# Patient Record
Sex: FEMALE | Race: WHITE
[De-identification: names, ages, dates, MRNs, and addresses within clinical notes are randomized per-mention and may not be internally consistent; named-entity substitution may affect disease eponyms.]

---

## 2018-03-29 NOTE — EDM.PDOC
ED HPI GENERAL MEDICAL PROBLEM





- General


Chief Complaint: Respiratory Problem


Stated Complaint: Shortness of breath


Time Seen by Provider: 03/29/18 19:14


Source of Information: Reports: Patient, EMS


History Limitations: Reports: No Limitations





- History of Present Illness


INITIAL COMMENTS - FREE TEXT/NARRATIVE: 





46 YO WF presents to ER by EMS with complaints of nonproductive cough and 

shortness of breath after exposure to bug spray. Pt reports she set off bug 

bombs in house earlier in the day and when she went back inside to check on the 

house she began to feel short of breath with persistent coughing causing her to 

call EMS for further evaluation and treatment. Pt with history of tobacco use 

and bronchitis but denies any chronic pulmonary problems. EMS reports pt met 

them at the curb in [facility]. Pt currently without any complaints and states she is 

feeling better after oxygen. Pt denies any eye or skin irritation. Raid bug 

bomb is nontoxic and without organophosphate per label. 


Onset: Sudden


Duration: Hour(s): (1)


Location: Reports: Generalized


Improves with: Reports: None


Worsens with: Reports: None


Associated Symptoms: Reports: Cough, Shortness of Breath.  Denies: Confusion, 

Chest Pain, cough w sputum, Diaphoresis, Fever/Chills, Headaches, Malaise, 

Nausea/Vomiting, Rash, Seizure, Syncope





ED ROS GENERAL





- Review of Systems


Review Of Systems: See Below


Constitutional: Reports: No Symptoms


HEENT: Reports: No Symptoms


Respiratory: Reports: Shortness of Breath


Cardiovascular: Reports: No Symptoms


Endocrine: Reports: No Symptoms


GI/Abdominal: Reports: No Symptoms


: Reports: No Symptoms


Musculoskeletal: Reports: No Symptoms


Skin: Reports: No Symptoms


Neurological: Reports: No Symptoms


Psychiatric: Reports: No Symptoms


Hematologic/Lymphatic: Reports: No Symptoms


Immunologic: Reports: No Symptoms





ED EXAM, GENERAL





- Physical Exam


Exam: See Below


Exam Limited By: No Limitations


General Appearance: Alert, WD/WN, No Apparent Distress


Eye Exam: Bilateral Eye: EOMI, PERRL


Nose: Normal Inspection, Normal Mucosa, No Blood


Throat/Mouth: Normal Inspection, Normal Lips, Normal Teeth, Normal Gums, Normal 

Oropharynx, Normal Voice, No Airway Compromise


Head: Atraumatic, Normocephalic


Neck: Normal Inspection, Supple, Non-Tender, Full Range of Motion


Respiratory/Chest: No Respiratory Distress, No Accessory Muscle Use, Chest Non-

Tender, Decreased Breath Sounds, Wheezing.  No: Respiratory Distress, Accessory 

Muscle Use, Retractions, Splinting, Prolonged Expiration


Cardiovascular: Normal Peripheral Pulses, Regular Rate, Rhythm, No Edema, No 

Gallop, No JVD, No Murmur, No Rub


GI/Abdominal: Normal Bowel Sounds, Soft, Non-Tender, No Organomegaly, No 

Distention, No Abnormal Bruit, No Mass


Back Exam: Normal Inspection, Full Range of Motion, NT


Extremities: Normal Inspection, Normal Range of Motion, Non-Tender, Normal 

Capillary Refill, No Pedal Edema


Neurological: Alert, Oriented, CN II-XII Intact, Normal Cognition, Normal Gait, 

Normal Reflexes, No Motor/Sensory Deficits


Psychiatric: Normal Affect, Normal Mood


Skin Exam: Warm, Dry, Intact, Normal Color, No Rash


Lymphatic: No Adenopathy





Course





- Vital Signs


Last Recorded V/S: 


 Last Vital Signs











Temp  36.5 C   03/29/18 19:27


 


Pulse  91   03/29/18 19:27


 


Resp  16   03/29/18 19:27


 


BP  119/68   03/29/18 19:27


 


Pulse Ox  95   03/29/18 19:27














- Orders/Labs/Meds


Orders: 


 Active Orders 24 hr











 Category Date Time Status


 


 RT Post Treatment Assessment [RC] Click to Edit Care  03/29/18 19:36 Ordered


 


 RT Pre-Treatment Assessment [RC] Click to Edit Care  03/29/18 19:36 Ordered


 


 Chest 2V [CR] Stat Exams  03/29/18 19:25 Ordered


 


 Albuterol [Ventolin HFA] Med  03/29/18 19:35 Ordered





 8 gm INH Q4H PRN   








 Medication Orders





Albuterol (Ventolin Hfa)  8 gm INH Q4H PRN


   PRN Reason: Shortness of Breath








Meds: 


Medications











Generic Name Dose Route Start Last Admin





  Trade Name Freq  PRN Reason Stop Dose Admin


 


Albuterol  8 gm  03/29/18 19:35  





  Ventolin Hfa  INH   





  Q4H PRN   





  Shortness of Breath   





     





     





     














Discontinued Medications














Generic Name Dose Route Start Last Admin





  Trade Name Freq  PRN Reason Stop Dose Admin


 


Albuterol/Ipratropium  Confirm  03/29/18 19:18  





  Duoneb 3.0-0.5 Mg/3 Ml  Administered  03/29/18 19:19  





  Dose   





  3 ml   





  .ROUTE   





  .STK-MED ONE   





     





     





     





     














- Radiology Interpretation


Free Text/Narrative:: 





CXR- NAD





Departure





- Departure


Time of Disposition: 19:38


Disposition: Home, Self-Care 01


Condition: Good


Clinical Impression: 


 Bronchospasm





Exposure to respiratory irritant


Qualifiers:


 Encounter type: initial encounter Qualified Code(s): T75.89XA - Other 

specified effects of external causes, initial encounter








- Discharge Information


Instructions:  Bronchospasm, Adult, Easy-to-Read


Referrals: 


Julisa Hernández MD [Physician] - 


Forms:  ED Department Discharge


Additional Instructions: 


1. discharge home


2. albuterol MDI Q4 and PRN 


3. avoid home x 24 hours due to irritant


4. follow up in clinic for further evaluation and treatment


5. return to ER for worsening symptoms





- My Orders


Last 24 Hours: 


My Active Orders





03/29/18 19:25


Chest 2V [CR] Stat 





03/29/18 19:35


Albuterol [Ventolin HFA]   8 gm INH Q4H PRN 





03/29/18 19:36


RT Post Treatment Assessment [RC] Click to Edit 


RT Pre-Treatment Assessment [RC] Click to Edit 














- Assessment/Plan


Last 24 Hours: 


My Active Orders





03/29/18 19:25


Chest 2V [CR] Stat 





03/29/18 19:35


Albuterol [Ventolin HFA]   8 gm INH Q4H PRN 





03/29/18 19:36


RT Post Treatment Assessment [RC] Click to Edit 


RT Pre-Treatment Assessment [RC] Click to Edit 











Assessment:: 





1. Bronchospasm secondary to respiratory irritant





Plan: 





1. discharge home


2. albuterol MDI Q4 and PRN 


3. avoid home x 24 hours due to irritant


4. follow up in clinic for further evaluation and treatment


5. return to ER for worsening symptoms

## 2019-01-22 ENCOUNTER — HOSPITAL ENCOUNTER (EMERGENCY)
Dept: HOSPITAL 77 - KA.ED | Age: 49
Discharge: HOME | End: 2019-01-22
Payer: MEDICAID

## 2019-01-22 DIAGNOSIS — Z91.030: ICD-10-CM

## 2019-01-22 DIAGNOSIS — Z91.040: ICD-10-CM

## 2019-01-22 DIAGNOSIS — Z79.899: ICD-10-CM

## 2019-01-22 DIAGNOSIS — Z88.8: ICD-10-CM

## 2019-01-22 DIAGNOSIS — G43.909: Primary | ICD-10-CM

## 2019-01-22 RX ADMIN — DIPHENHYDRAMINE HYDROCHLORIDE ONE MG: 50 INJECTION, SOLUTION INTRAMUSCULAR; INTRAVENOUS at 21:54

## 2019-01-22 RX ADMIN — METHYLPREDNISOLONE SODIUM SUCCINATE ONE MG: 125 INJECTION, POWDER, FOR SOLUTION INTRAMUSCULAR; INTRAVENOUS at 21:53

## 2019-01-22 RX ADMIN — ONDANSETRON ONE MG: 2 INJECTION, SOLUTION INTRAMUSCULAR; INTRAVENOUS at 21:54

## 2019-01-22 NOTE — EDM.PDOC
ED HPI GENERAL MEDICAL PROBLEM





- General


Chief Complaint: Headache


Stated Complaint: HEADACHE


Time Seen by Provider: 01/22/19 21:45


Source of Information: Reports: Patient


History Limitations: Reports: No Limitations





- History of Present Illness


INITIAL COMMENTS - FREE TEXT/NARRATIVE: 





Patient is a 48-year-old female who presents to the emergency department this 

evening with a complaint of headache.  Patient states she does have a history 

of chronic migraine headaches, this headache began this morning and 

progressively worsened during the day.  Patient said that she had one episode 

of nausea and vomiting at 1700 today.  Patient has an extensive psych history, 

and is on multiple psychological medications.  Patient denies fever, trauma, 

chest pain, shortness of breath, head injury, abdominal pain, blurry vision, 

out of country travel, or social drug use.





Onset: Gradual


Duration: Hour(s):


Location: Reports: Head


Quality: Reports: Pressure


Severity: Moderate


Improves with: Reports: None


Worsens with: Reports: None


Associated Symptoms: Reports: Headaches, Nausea/Vomiting.  Denies: Chest Pain, 

Fever/Chills, Shortness of Breath


  ** Headache


Pain Score (Numeric/FACES): 10





- Related Data


 Allergies











Allergy/AdvReac Type Severity Reaction Status Date / Time


 


aspirin Allergy  Itching Verified 01/22/19 21:22


 


bee venom protein (honey bee) Allergy  Anaphylactic Verified 01/22/19 21:22





   Shock  


 


Fish Containing Products Allergy  Anaphylactic Verified 01/22/19 21:22





   Shock  


 


hydrochlorothiazide Allergy  Hives Verified 01/22/19 21:22


 


latex Allergy  Rash Verified 01/22/19 21:22


 


shellfish derived Allergy  Anaphylactic Verified 01/22/19 21:22





   Shock  











Home Meds: 


 Home Meds





Famotidine 20 mg PO DAILY 03/30/18 [History]


Gabapentin [Neurontin] 300 mg PO BID 03/30/18 [History]


PARoxetine HCl [Paroxetine HCl] 30 mg PO DAILY 03/30/18 [History]


Pentoxifylline [TRENtal] 400 mg PO BID 03/30/18 [History]


busPIRone [Buspar] 15 mg PO BID 03/30/18 [History]


diazePAM [Valium] 5 mg PO BID 03/30/18 [History]


Cyclobenzaprine [Flexeril] 10 mg PO BID 10/24/18 [History]


Loratadine/Pseudoephedrine [Claritin-D 24 Hour Tablet] 1 each PO DAILY 10/24/18 

[History]


Rizatriptan Benzoate [Rizatriptan] 10 mg PO ASDIRECTED PRN 10/24/18 [History]


Meclizine [Antivert] 25 mg PO BID 12/18/18 [History]


Hydrocodone/Acetaminophen [Hydrocodon-Acetaminophn ] 1 tab PO BID 01/22/ 19 [History]











Past Medical History


HEENT History: Reports: Otitis Media, Sinusitis


Cardiovascular History: Reports: Heart Murmur


Respiratory History: Reports: Bronchitis, Recurrent, Pneumonia, Recurrent


Gastrointestinal History: Reports: GERD


OB/GYN History: Reports: Pregnancy


Musculoskeletal History: Reports: Fibromyalgia, Other (See Below)


Other Musculoskeletal History: tendonitis, bursitis


Psychiatric History: Reports: Addiction, Anxiety, Bipolar, Dementia, Panic 

Attack, PTSD





- Past Surgical History


HEENT Surgical History: Reports: Tonsillectomy


Female  Surgical History: Reports: Hysterectomy


Neurological Surgical History: Reports: Laminectomy





Social & Family History





- Caffeine Use


Caffeine Use: Reports: Coffee, Soda





ED ROS GENERAL





- Review of Systems


Review Of Systems: ROS reveals no pertinent complaints other than HPI.


Constitutional: Reports: No Symptoms


HEENT: Reports: No Symptoms


Respiratory: Reports: No Symptoms


Cardiovascular: Reports: No Symptoms


Endocrine: Reports: No Symptoms


GI/Abdominal: Reports: No Symptoms


: Reports: No Symptoms


Musculoskeletal: Reports: No Symptoms


Skin: Reports: No Symptoms


Neurological: Reports: Headache.  Denies: Syncope, Trouble Speaking, Change in 

Speech, Gait Disturbance


Psychiatric: Reports: Anxiety.  Denies: Homicidal Ideation, Suicidal Ideation


Hematologic/Lymphatic: Reports: No Symptoms


Immunologic: Reports: No Symptoms





- Physical Exam


Exam: See Below


Exam Limited By: No Limitations


General Appearance: Alert, WD/WN, Mild Distress


Eye Exam: Bilateral Eye: Normal Inspection


Ears: Normal External Exam, Normal Canal, Normal TMs


Nose: Normal Inspection, Normal Mucosa, No Blood


Throat/Mouth: Normal Inspection, Normal Oropharynx, No Airway Compromise


Head Exam: Atraumatic, Normocephalic


Neck: Normal Inspection, Supple, Non-Tender, Full Range of Motion


Respiratory/Chest: No Respiratory Distress, Lungs Clear, Normal Breath Sounds, 

No Accessory Muscle Use, Chest Non-Tender


Cardiovascular: Normal Peripheral Pulses, Regular Rate, Rhythm, No Murmur


GI/Abdominal: Normal Bowel Sounds, Soft, Non-Tender, No Organomegaly, No 

Abnormal Bruit, No Mass


Neuro Exam (Abbreviated): Alert, Oriented, CN II-XII Intact, Normal Cognition, 

No Motor/Sensory Deficits


Back Exam: Normal Inspection.  No: CVA Tenderness (L), CVA Tenderness (R)


Extremities: Normal Inspection


Psychiatric: Anxious


Skin Exam: Warm, Dry, Intact, Normal Color, No Rash





Course





- Vital Signs


Last Recorded V/S: 





 Last Vital Signs











Temp  97.4 F   01/22/19 21:16


 


Pulse  91   01/22/19 21:16


 


Resp  18   01/22/19 21:16


 


BP  131/86   01/22/19 21:16


 


Pulse Ox  96   01/22/19 21:16














- Orders/Labs/Meds


Orders: 





 Active Orders 24 hr











 Category Date Time Status


 


 Peripheral IV Care [RC] .AS DIRECTED Care  01/22/19 21:46 Ordered


 


 Sodium Chloride 0.9% @ 999 MLS/HR (1000ml) Med  01/22/19 21:45 Ordered





 Sodium Chloride 0.9% [Normal Saline] 1,000 ml   





 IV .BOLUS   


 


 Sodium Chloride 0.9% [Saline Flush] Med  01/22/19 21:45 Ordered





 10 ml FLUSH Q8HR PRN   


 


 Peripheral IV Insertion Adult [OM.PC] Routine Oth  01/22/19 21:45 Ordered








 Medication Orders





Sodium Chloride (Normal Saline)  1,000 mls @ 999 mls/hr IV .BOLUS ONE


   Stop: 01/22/19 22:45


Sodium Chloride (Saline Flush)  10 ml FLUSH Q8HR PRN


   PRN Reason: keep vein open








Meds: 





Medications











Generic Name Dose Route Start Last Admin





  Trade Name Freq  PRN Reason Stop Dose Admin


 


Sodium Chloride  1,000 mls @ 999 mls/hr  01/22/19 21:45  





  Normal Saline  IV  01/22/19 22:45  





  .BOLUS ONE   





     





     





     





     


 


Sodium Chloride  10 ml  01/22/19 21:45  





  Saline Flush  FLUSH   





  Q8HR PRN   





  keep vein open   





     





     





     














Discontinued Medications














Generic Name Dose Route Start Last Admin





  Trade Name Freq  PRN Reason Stop Dose Admin


 


Diphenhydramine HCl  25 mg  01/22/19 21:45  





  Benadryl  IVPUSH  01/22/19 21:46  





  ONETIME ONE   





     





     





     





     


 


Methylprednisolone Sodium Succinate  125 mg  01/22/19 21:45  





  Solu-Medrol  IVPUSH  01/22/19 21:46  





  ONETIME ONE   





     





     





     





     


 


Ondansetron HCl  4 mg  01/22/19 21:45  





  Zofran  IVPUSH  01/22/19 21:46  





  ONETIME ONE   





     





     





     





     














- Re-Assessments/Exams


Free Text/Narrative Re-Assessment/Exam: 





01/22/19 22:17


Patient afebrile, nontoxic appearing, vital signs stable, headache, mostly 

relieved.  Patient will follow-up with their PCP.





Departure





- Departure


Time of Disposition: 22:18


Disposition: Home, Self-Care 01


Condition: Good


Clinical Impression: 


 Migraine








- Discharge Information


Instructions:  Recurrent Migraine Headache, Easy-to-Read


Additional Instructions: 


Follow-up with PCP in next 2-3 days.  Return to emergency department sooner 

symptoms continue or worsen.





- My Orders


Last 24 Hours: 





My Active Orders





01/22/19 21:45


Sodium Chloride 0.9% @ 999 MLS/HR (1000ml) Sodium Chloride 0.9% [Normal Saline] 

1,000 ml IV .BOLUS 


Sodium Chloride 0.9% [Saline Flush]   10 ml FLUSH Q8HR PRN 


Peripheral IV Insertion Adult [OM.PC] Routine 





01/22/19 21:46


Peripheral IV Care [RC] .AS DIRECTED 














- Assessment/Plan


Last 24 Hours: 





My Active Orders





01/22/19 21:45


Sodium Chloride 0.9% @ 999 MLS/HR (1000ml) Sodium Chloride 0.9% [Normal Saline] 

1,000 ml IV .BOLUS 


Sodium Chloride 0.9% [Saline Flush]   10 ml FLUSH Q8HR PRN 


Peripheral IV Insertion Adult [OM.PC] Routine 





01/22/19 21:46


Peripheral IV Care [RC] .AS DIRECTED 











Assessment:: 





Migraine headache


Plan: 





Follow-up with PCP